# Patient Record
Sex: MALE | Race: OTHER | HISPANIC OR LATINO | Employment: FULL TIME | URBAN - METROPOLITAN AREA
[De-identification: names, ages, dates, MRNs, and addresses within clinical notes are randomized per-mention and may not be internally consistent; named-entity substitution may affect disease eponyms.]

---

## 2018-11-07 RX ORDER — CLOBETASOL PROPIONATE 0.5 MG/G
OINTMENT TOPICAL
Refills: 1 | COMMUNITY
Start: 2018-09-27 | End: 2018-11-08 | Stop reason: ALTCHOICE

## 2018-11-07 RX ORDER — TACROLIMUS 1 MG/G
OINTMENT TOPICAL
Refills: 1 | COMMUNITY
Start: 2018-09-27 | End: 2018-11-08 | Stop reason: ALTCHOICE

## 2018-11-08 ENCOUNTER — OFFICE VISIT (OUTPATIENT)
Dept: FAMILY MEDICINE CLINIC | Facility: CLINIC | Age: 43
End: 2018-11-08
Payer: COMMERCIAL

## 2018-11-08 VITALS
DIASTOLIC BLOOD PRESSURE: 76 MMHG | TEMPERATURE: 97.6 F | HEART RATE: 74 BPM | WEIGHT: 185 LBS | HEIGHT: 70 IN | OXYGEN SATURATION: 97 % | SYSTOLIC BLOOD PRESSURE: 110 MMHG | BODY MASS INDEX: 26.48 KG/M2

## 2018-11-08 DIAGNOSIS — Z13.1 SCREENING FOR DIABETES MELLITUS (DM): ICD-10-CM

## 2018-11-08 DIAGNOSIS — G89.29 CHRONIC PAIN OF LEFT KNEE: ICD-10-CM

## 2018-11-08 DIAGNOSIS — M25.562 CHRONIC PAIN OF LEFT KNEE: ICD-10-CM

## 2018-11-08 DIAGNOSIS — R10.32 LEFT INGUINAL PAIN: ICD-10-CM

## 2018-11-08 DIAGNOSIS — N52.9 ERECTILE DYSFUNCTION, UNSPECIFIED ERECTILE DYSFUNCTION TYPE: Primary | ICD-10-CM

## 2018-11-08 DIAGNOSIS — Z13.220 SCREENING FOR LIPID DISORDERS: ICD-10-CM

## 2018-11-08 PROBLEM — F12.10 MILD TETRAHYDROCANNABINOL (THC) ABUSE: Status: ACTIVE | Noted: 2018-11-08

## 2018-11-08 PROBLEM — Z72.0 TOBACCO ABUSE: Status: ACTIVE | Noted: 2018-11-08

## 2018-11-08 PROCEDURE — 99203 OFFICE O/P NEW LOW 30 MIN: CPT | Performed by: INTERNAL MEDICINE

## 2018-11-08 NOTE — PATIENT INSTRUCTIONS

## 2018-11-08 NOTE — PROGRESS NOTES
Assessment/Plan:    No problem-specific Assessment & Plan notes found for this encounter  Diagnoses and all orders for this visit:    Erectile dysfunction, unspecified erectile dysfunction type  -     CBC and differential; Future  -     Comprehensive metabolic panel; Future  -     TSH, 3rd generation with Free T4 reflex; Future  -     Testosterone, free, total; Future    Chronic pain of left knee    Left inguinal pain  -     CBC and differential; Future  -     Comprehensive metabolic panel; Future  -     Ambulatory referral to General Surgery; Future    Screening for lipid disorders  -     Lipid Panel with Direct LDL reflex; Future    Screening for diabetes mellitus (DM)  -     Hemoglobin A1C; Future    Other orders  -     Discontinue: clobetasol (TEMOVATE) 0 05 % ointment; APPLY TO RASH TWICE A DAY FOR 2 WEEKS  -     Discontinue: tacrolimus (PROTOPIC) 0 1 % ointment; APPLY DAILY FOR MAINTENANCE      A/P: Will check labs  Suspect pt has the starts of an inguinal hernia or just a recurrent MS strain/sprain  Does not appear to be diverticulitis or other GI or  event  Will refer to general sx for second opinion  Knee lesion appears to be a cyst or lipoma  Monitor and wear knee pads  May need to have is sx removed due to location  Will check labs including a testosterone to r/o organic cause for the ED  If negative, will trial viagra  Wean tobacco  Refusing a flu vaccine  RTC 2 weeks for f/u  Subjective:      Patient ID: Grace Cronin is a 39 y o  male  WM, former pt of Dr Deborah Cervantes, presents to establish  No prior PMH except for ongoing tobacco and THC use  Several c/o's  Pt c/o three month h/o intermittent left groin pain  Works as a [employer]  NO injuries  NO known triggers  No changes with food or position  No fever, chills, or sweats  No weight change  No changes in bowel or bladder habits  FHX of diverticulosis  No BRBPR, melena, hematochezia, etc  No masses noted   Pain is sharp and last several seconds and then he is fine  Next, notes several months of difficulty obtain and maintaining an erection  Hardness is an issue as well and unable to complete the act at times  NO penile lesions or d/c  NO new meds  Finally, had remote ACL sx on the left knee and over the past several years, notes a mass on the knee that can fluctuate in size and sometimes becomes tender  No redness or increase temp  No trauma, but does a lot of kneeling  Due for labs and vaccines  The following portions of the patient's history were reviewed and updated as appropriate:   He  has no past medical history on file  He   Patient Active Problem List    Diagnosis Date Noted    Tobacco abuse 11/08/2018    Erectile dysfunction 11/08/2018    Chronic pain of left knee 11/08/2018    Mild tetrahydrocannabinol [facility]) abuse 11/08/2018     He  has a past surgical history that includes Knee surgery (Left)  His family history includes Diverticulosis in his mother  He  reports that he has been smoking Cigarettes  He has a 25 00 pack-year smoking history  He has never used smokeless tobacco  He reports that he drinks about 16 8 oz of alcohol per week   He reports that he uses drugs, including Marijuana  No current outpatient prescriptions on file  No current facility-administered medications for this visit  No current outpatient prescriptions on file prior to visit  No current facility-administered medications on file prior to visit  He is allergic to aleve [naproxen]       Review of Systems   Constitutional: Negative for activity change, chills, diaphoresis, fatigue and fever  HENT: Negative  Eyes: Negative for visual disturbance  Respiratory: Negative for cough, chest tightness, shortness of breath and wheezing  Cardiovascular: Negative for chest pain, palpitations and leg swelling  Gastrointestinal: Positive for abdominal pain   Negative for abdominal distention, anal bleeding, blood in stool, constipation, diarrhea, nausea, rectal pain and vomiting  Endocrine: Negative for cold intolerance and heat intolerance  Genitourinary: Negative for difficulty urinating, discharge, dysuria, frequency, genital sores, hematuria, penile pain, penile swelling, scrotal swelling and testicular pain  ED issues   Musculoskeletal: Positive for arthralgias  Negative for gait problem and myalgias  Neurological: Negative for dizziness, seizures, syncope, weakness, light-headedness and headaches  Hematological: Does not bruise/bleed easily  Psychiatric/Behavioral: Negative for confusion, dysphoric mood and sleep disturbance  The patient is not nervous/anxious  Objective:      /76 (BP Location: Left arm, Patient Position: Sitting, Cuff Size: Standard)   Pulse 74   Temp 97 6 °F (36 4 °C) (Tympanic)   Ht 5' 10" (1 778 m)   Wt 83 9 kg (185 lb)   SpO2 97%   BMI 26 54 kg/m²          Physical Exam   Constitutional: He is oriented to person, place, and time  He appears well-developed and well-nourished  No distress  HENT:   Head: Normocephalic and atraumatic  Mouth/Throat: Oropharynx is clear and moist    Eyes: Pupils are equal, round, and reactive to light  Conjunctivae and EOM are normal    Neck: Normal range of motion  Neck supple  No JVD present  No tracheal deviation present  No thyromegaly present  Cardiovascular: Normal rate, regular rhythm and normal heart sounds  No murmur heard  Pulmonary/Chest: Effort normal and breath sounds normal  No respiratory distress  He has no wheezes  Abdominal: Soft  Bowel sounds are normal  He exhibits no distension and no mass  There is no tenderness  There is no rebound and no guarding  Left inguinal area with tenderness with deep palpation over the medial area with ??small muscle wall defect  No masses  Testes descended bilat and no masses or tenderness  Genitourinary: Penis normal    Musculoskeletal: Normal range of motion   He exhibits no edema, tenderness or deformity  Left lateral patellar area with a dime sized fluctuant and mobile, but currently non tender cyst like lesion  Lymphadenopathy:     He has no cervical adenopathy  Neurological: He is alert and oriented to person, place, and time  He has normal reflexes  No cranial nerve deficit  Coordination normal    Psychiatric: He has a normal mood and affect  His behavior is normal  Judgment and thought content normal    Nursing note and vitals reviewed

## 2018-11-15 ENCOUNTER — TRANSCRIBE ORDERS (OUTPATIENT)
Dept: LAB | Facility: CLINIC | Age: 43
End: 2018-11-15

## 2018-11-15 ENCOUNTER — OFFICE VISIT (OUTPATIENT)
Dept: SURGERY | Facility: CLINIC | Age: 43
End: 2018-11-15
Payer: COMMERCIAL

## 2018-11-15 ENCOUNTER — APPOINTMENT (OUTPATIENT)
Dept: LAB | Facility: CLINIC | Age: 43
End: 2018-11-15
Payer: COMMERCIAL

## 2018-11-15 VITALS
HEIGHT: 70 IN | RESPIRATION RATE: 14 BRPM | BODY MASS INDEX: 26.08 KG/M2 | WEIGHT: 182.2 LBS | TEMPERATURE: 98.1 F | HEART RATE: 68 BPM | SYSTOLIC BLOOD PRESSURE: 102 MMHG | DIASTOLIC BLOOD PRESSURE: 68 MMHG

## 2018-11-15 DIAGNOSIS — K40.20 NON-RECURRENT BILATERAL INGUINAL HERNIA WITHOUT OBSTRUCTION OR GANGRENE: Primary | ICD-10-CM

## 2018-11-15 DIAGNOSIS — R10.32 LEFT INGUINAL PAIN: ICD-10-CM

## 2018-11-15 DIAGNOSIS — Z13.220 SCREENING FOR LIPID DISORDERS: ICD-10-CM

## 2018-11-15 DIAGNOSIS — N52.9 ERECTILE DYSFUNCTION, UNSPECIFIED ERECTILE DYSFUNCTION TYPE: ICD-10-CM

## 2018-11-15 DIAGNOSIS — Z13.1 SCREENING FOR DIABETES MELLITUS (DM): ICD-10-CM

## 2018-11-15 PROCEDURE — 84403 ASSAY OF TOTAL TESTOSTERONE: CPT

## 2018-11-15 PROCEDURE — 84402 ASSAY OF FREE TESTOSTERONE: CPT

## 2018-11-15 PROCEDURE — 85025 COMPLETE CBC W/AUTO DIFF WBC: CPT

## 2018-11-15 PROCEDURE — 84443 ASSAY THYROID STIM HORMONE: CPT

## 2018-11-15 PROCEDURE — 99243 OFF/OP CNSLTJ NEW/EST LOW 30: CPT | Performed by: SURGERY

## 2018-11-15 PROCEDURE — 83036 HEMOGLOBIN GLYCOSYLATED A1C: CPT

## 2018-11-15 PROCEDURE — 36415 COLL VENOUS BLD VENIPUNCTURE: CPT

## 2018-11-15 PROCEDURE — 80061 LIPID PANEL: CPT

## 2018-11-15 PROCEDURE — 80053 COMPREHEN METABOLIC PANEL: CPT

## 2018-11-15 RX ORDER — ENOXAPARIN SODIUM 300 MG/3ML
40 INJECTION INTRAVENOUS; SUBCUTANEOUS
Status: CANCELLED | OUTPATIENT
Start: 2018-11-15 | End: 2018-11-16

## 2018-11-15 RX ORDER — SODIUM CHLORIDE, SODIUM LACTATE, POTASSIUM CHLORIDE, CALCIUM CHLORIDE 600; 310; 30; 20 MG/100ML; MG/100ML; MG/100ML; MG/100ML
125 INJECTION, SOLUTION INTRAVENOUS
Status: CANCELLED | OUTPATIENT
Start: 2018-11-15 | End: 2018-11-16

## 2018-11-15 NOTE — PROGRESS NOTES
Consult- General Surgery   Selina Soto 39 y o  male MRN: [id number]  Unit/Bed#:  Encounter: [id number]    Assessment/Plan     Assessment:  Bilateral inguinal hernia, symptomatic  Plan:  I advised the patient to undergo laparoscopic bilateral inguinal hernia repair with mesh in the near future  I discussed the operative procedure, risks, benefits and alternatives with the patient, he understood and agreed to proceed  The patient will not require any pre-admission testing since he already had CBC and CMP today  History of Present Illness     HPI:  Selina Soto is a 39 y o  male who presents my office for evaluation of left groin pain  The patient noted the pain on the left groin for the past 3 months, on and off, not related to any type of lifting or positioning  He denied having any bulge  He went to see his primary care physician and referred to us for further evaluation  He denied having any nausea, vomiting, diarrhea, constipation or urinary symptoms  Review of Systems   Constitutional: Negative for chills and fever  HENT: Negative for nosebleeds and sore throat  Eyes: Negative for pain and discharge  Respiratory: Negative for cough and shortness of breath  Cardiovascular: Negative for chest pain and palpitations  Gastrointestinal: Negative for abdominal pain, blood in stool, constipation, diarrhea and nausea  Endocrine: Negative for cold intolerance and heat intolerance  Genitourinary: Negative for dysuria and hematuria  Neurological: Negative for seizures and headaches  Hematological: Negative for adenopathy  Does not bruise/bleed easily  Psychiatric/Behavioral: Negative for confusion  The patient is not nervous/anxious  Historical Information   No past medical history on file    Past Surgical History:   Procedure Laterality Date    KNEE SURGERY Left     ACL repaired     Social History   History   Alcohol Use    16 8 oz/week    28 Cans of beer per week     History Drug Use    Types: Marijuana     Comment: sometimes      History   Smoking Status    Current Every Day Smoker    Packs/day: 1 00    Years: 25 00    Types: Cigarettes   Smokeless Tobacco    Never Used     Family History: non-contributory    Meds/Allergies   all medications and allergies reviewed   No current outpatient prescriptions on file  Allergies   Allergen Reactions    Aleve [Naproxen] Rash       Objective     Current Vitals:   Blood Pressure: 102/68 (11/15/18 1023)  Pulse: 68 (11/15/18 1023)  Temperature: 98 1 °F (36 7 °C) (11/15/18 1023)  Temp Source: Oral (11/15/18 1023)  Respirations: 14 (11/15/18 1023)  Height: 5' 10" (177 8 cm) (11/15/18 1023)  Weight - Scale: 82 6 kg (182 lb 3 2 oz) (11/15/18 1023)      Invasive Devices          No matching active lines, drains, or airways          Physical Exam   Constitutional: He is oriented to person, place, and time  He appears well-developed and well-nourished  No distress  HENT:   Head: Normocephalic  Mouth/Throat: No oropharyngeal exudate  Eyes: Pupils are equal, round, and reactive to light  No scleral icterus  Neck: Normal range of motion  Cardiovascular: Normal rate and regular rhythm  No murmur heard  Pulmonary/Chest: Effort normal and breath sounds normal  No respiratory distress  Abdominal: Soft  He exhibits no mass  There is no tenderness  Genitourinary:   Genitourinary Comments: Digital examination of the right and left inguinal canal with Valsalva maneuver revealed easily reducible bilateral inguinal hernia  Left side greater than the right  Musculoskeletal: He exhibits no edema or tenderness  Lymphadenopathy:     He has no cervical adenopathy  Neurological: He is alert and oriented to person, place, and time  No cranial nerve deficit  Skin: No rash noted  No erythema  Psychiatric: He has a normal mood and affect   His behavior is normal

## 2018-11-16 LAB
ALBUMIN SERPL BCP-MCNC: 4 G/DL (ref 3.5–5)
ALP SERPL-CCNC: 59 U/L (ref 46–116)
ALT SERPL W P-5'-P-CCNC: 19 U/L (ref 12–78)
ANION GAP SERPL CALCULATED.3IONS-SCNC: 5 MMOL/L (ref 4–13)
AST SERPL W P-5'-P-CCNC: 14 U/L (ref 5–45)
BASOPHILS # BLD AUTO: 0.01 THOUSANDS/ΜL (ref 0–0.1)
BASOPHILS NFR BLD AUTO: 0 % (ref 0–1)
BILIRUB SERPL-MCNC: 1.51 MG/DL (ref 0.2–1)
BUN SERPL-MCNC: 18 MG/DL (ref 5–25)
CALCIUM SERPL-MCNC: 8.3 MG/DL (ref 8.3–10.1)
CHLORIDE SERPL-SCNC: 106 MMOL/L (ref 100–108)
CHOLEST SERPL-MCNC: 188 MG/DL (ref 50–200)
CO2 SERPL-SCNC: 26 MMOL/L (ref 21–32)
CREAT SERPL-MCNC: 1.18 MG/DL (ref 0.6–1.3)
EOSINOPHIL # BLD AUTO: 0.05 THOUSAND/ΜL (ref 0–0.61)
EOSINOPHIL NFR BLD AUTO: 1 % (ref 0–6)
ERYTHROCYTE [DISTWIDTH] IN BLOOD BY AUTOMATED COUNT: 13.7 % (ref 11.6–15.1)
EST. AVERAGE GLUCOSE BLD GHB EST-MCNC: 108 MG/DL
GFR SERPL CREATININE-BSD FRML MDRD: 75 ML/MIN/1.73SQ M
GLUCOSE P FAST SERPL-MCNC: 91 MG/DL (ref 65–99)
HBA1C MFR BLD: 5.4 % (ref 4.2–6.3)
HCT VFR BLD AUTO: 48 % (ref 36.5–49.3)
HDLC SERPL-MCNC: 60 MG/DL (ref 40–60)
HGB BLD-MCNC: 15.5 G/DL (ref 12–17)
IMM GRANULOCYTES # BLD AUTO: 0.02 THOUSAND/UL (ref 0–0.2)
IMM GRANULOCYTES NFR BLD AUTO: 0 % (ref 0–2)
LDLC SERPL CALC-MCNC: 111 MG/DL (ref 0–100)
LYMPHOCYTES # BLD AUTO: 2.22 THOUSANDS/ΜL (ref 0.6–4.47)
LYMPHOCYTES NFR BLD AUTO: 30 % (ref 14–44)
MCH RBC QN AUTO: 30.2 PG (ref 26.8–34.3)
MCHC RBC AUTO-ENTMCNC: 32.3 G/DL (ref 31.4–37.4)
MCV RBC AUTO: 94 FL (ref 82–98)
MONOCYTES # BLD AUTO: 0.42 THOUSAND/ΜL (ref 0.17–1.22)
MONOCYTES NFR BLD AUTO: 6 % (ref 4–12)
NEUTROPHILS # BLD AUTO: 4.61 THOUSANDS/ΜL (ref 1.85–7.62)
NEUTS SEG NFR BLD AUTO: 63 % (ref 43–75)
NRBC BLD AUTO-RTO: 0 /100 WBCS
PLATELET # BLD AUTO: 322 THOUSANDS/UL (ref 149–390)
PMV BLD AUTO: 10.4 FL (ref 8.9–12.7)
POTASSIUM SERPL-SCNC: 4.4 MMOL/L (ref 3.5–5.3)
PROT SERPL-MCNC: 7.7 G/DL (ref 6.4–8.2)
RBC # BLD AUTO: 5.13 MILLION/UL (ref 3.88–5.62)
SODIUM SERPL-SCNC: 137 MMOL/L (ref 136–145)
TRIGL SERPL-MCNC: 84 MG/DL
TSH SERPL DL<=0.05 MIU/L-ACNC: 0.98 UIU/ML (ref 0.36–3.74)
WBC # BLD AUTO: 7.33 THOUSAND/UL (ref 4.31–10.16)

## 2018-11-18 LAB
TESTOST FREE SERPL-MCNC: 15.7 PG/ML (ref 6.8–21.5)
TESTOST SERPL-MCNC: 575 NG/DL (ref 264–916)

## 2018-12-04 ENCOUNTER — ANESTHESIA EVENT (OUTPATIENT)
Dept: PERIOP | Facility: HOSPITAL | Age: 43
End: 2018-12-04
Payer: COMMERCIAL

## 2018-12-04 NOTE — ANESTHESIA PREPROCEDURE EVALUATION
Review of Systems/Medical History    Chart reviewed  No history of anesthetic complications     Cardiovascular  EKG reviewed, Negative cardio ROS    Pulmonary  Smoker cigarette smoker  , Tobacco cessation counseling given Cumulative Pack Years: 25,   Comment: Marijuana use     GI/Hepatic  Negative GI/hepatic ROS     Comment: Non recurrent B/L inguinal hernia w/o obstruction     Negative  ROS        Endo/Other  Negative endo/other ROS      GYN       Hematology  Negative hematology ROS      Musculoskeletal    Comment: Chronic left knee pain      Neurology  Negative neurology ROS      Psychology   Negative psychology ROS              Physical Exam    Airway    Mallampati score: II         Dental       Cardiovascular  Comment: Negative ROS, Cardiovascular exam normal    Pulmonary  Pulmonary exam normal     Other Findings        Anesthesia Plan  ASA Score- 2     Anesthesia Type- general with ASA Monitors  Additional Monitors:   Airway Plan: ETT  Plan Factors- Patient instructed to abstain from smoking on day of procedure  Patient smoked on day of surgery  Induction- intravenous  Postoperative Plan- Plan for postoperative opioid use  Planned trial extubation    Informed Consent- Anesthetic plan and risks discussed with patient

## 2018-12-05 ENCOUNTER — HOSPITAL ENCOUNTER (OUTPATIENT)
Facility: HOSPITAL | Age: 43
Setting detail: OUTPATIENT SURGERY
Discharge: HOME | End: 2018-12-05
Attending: SURGERY | Admitting: SURGERY
Payer: COMMERCIAL

## 2018-12-05 ENCOUNTER — ANESTHESIA (OUTPATIENT)
Dept: PERIOP | Facility: HOSPITAL | Age: 43
End: 2018-12-05
Payer: COMMERCIAL

## 2018-12-05 VITALS
HEIGHT: 71 IN | WEIGHT: 181.25 LBS | RESPIRATION RATE: 20 BRPM | DIASTOLIC BLOOD PRESSURE: 66 MMHG | OXYGEN SATURATION: 97 % | BODY MASS INDEX: 25.37 KG/M2 | HEART RATE: 64 BPM | TEMPERATURE: 96.9 F | SYSTOLIC BLOOD PRESSURE: 102 MMHG

## 2018-12-05 DIAGNOSIS — K40.20 NON-RECURRENT BILATERAL INGUINAL HERNIA WITHOUT OBSTRUCTION OR GANGRENE: ICD-10-CM

## 2018-12-05 PROCEDURE — 49650 LAP ING HERNIA REPAIR INIT: CPT | Performed by: SURGERY

## 2018-12-05 PROCEDURE — C1781 MESH (IMPLANTABLE): HCPCS | Performed by: SURGERY

## 2018-12-05 PROCEDURE — 49650 LAP ING HERNIA REPAIR INIT: CPT | Performed by: PHYSICIAN ASSISTANT

## 2018-12-05 DEVICE — MESH LAP ABSORBABLE 10 X 15CM PROGRIP: Type: IMPLANTABLE DEVICE | Site: INGUINAL | Status: FUNCTIONAL

## 2018-12-05 RX ORDER — ONDANSETRON 2 MG/ML
4 INJECTION INTRAMUSCULAR; INTRAVENOUS ONCE
Status: DISCONTINUED | OUTPATIENT
Start: 2018-12-05 | End: 2018-12-05 | Stop reason: HOSPADM

## 2018-12-05 RX ORDER — LIDOCAINE HYDROCHLORIDE 10 MG/ML
INJECTION, SOLUTION INFILTRATION; PERINEURAL AS NEEDED
Status: DISCONTINUED | OUTPATIENT
Start: 2018-12-05 | End: 2018-12-05 | Stop reason: SURG

## 2018-12-05 RX ORDER — ROCURONIUM BROMIDE 10 MG/ML
INJECTION, SOLUTION INTRAVENOUS AS NEEDED
Status: DISCONTINUED | OUTPATIENT
Start: 2018-12-05 | End: 2018-12-05 | Stop reason: SURG

## 2018-12-05 RX ORDER — DEXMEDETOMIDINE HYDROCHLORIDE 100 UG/ML
INJECTION, SOLUTION INTRAVENOUS AS NEEDED
Status: DISCONTINUED | OUTPATIENT
Start: 2018-12-05 | End: 2018-12-05 | Stop reason: SURG

## 2018-12-05 RX ORDER — ALBUTEROL SULFATE 2.5 MG/3ML
2.5 SOLUTION RESPIRATORY (INHALATION) ONCE
Status: DISCONTINUED | OUTPATIENT
Start: 2018-12-05 | End: 2018-12-05 | Stop reason: HOSPADM

## 2018-12-05 RX ORDER — SODIUM CHLORIDE, SODIUM LACTATE, POTASSIUM CHLORIDE, CALCIUM CHLORIDE 600; 310; 30; 20 MG/100ML; MG/100ML; MG/100ML; MG/100ML
125 INJECTION, SOLUTION INTRAVENOUS
Status: COMPLETED | OUTPATIENT
Start: 2018-12-05 | End: 2018-12-05

## 2018-12-05 RX ORDER — FENTANYL CITRATE 50 UG/ML
INJECTION, SOLUTION INTRAMUSCULAR; INTRAVENOUS AS NEEDED
Status: DISCONTINUED | OUTPATIENT
Start: 2018-12-05 | End: 2018-12-05 | Stop reason: SURG

## 2018-12-05 RX ORDER — BUPIVACAINE HYDROCHLORIDE 2.5 MG/ML
INJECTION, SOLUTION EPIDURAL; INFILTRATION; INTRACAUDAL AS NEEDED
Status: DISCONTINUED | OUTPATIENT
Start: 2018-12-05 | End: 2018-12-05 | Stop reason: HOSPADM

## 2018-12-05 RX ORDER — ONDANSETRON 2 MG/ML
4 INJECTION INTRAMUSCULAR; INTRAVENOUS EVERY 6 HOURS PRN
Status: DISCONTINUED | OUTPATIENT
Start: 2018-12-05 | End: 2018-12-05 | Stop reason: HOSPADM

## 2018-12-05 RX ORDER — GLYCOPYRROLATE 0.2 MG/ML
INJECTION INTRAMUSCULAR; INTRAVENOUS AS NEEDED
Status: DISCONTINUED | OUTPATIENT
Start: 2018-12-05 | End: 2018-12-05 | Stop reason: SURG

## 2018-12-05 RX ORDER — CEFAZOLIN SODIUM 2 G/50ML
2000 SOLUTION INTRAVENOUS
Status: COMPLETED | OUTPATIENT
Start: 2018-12-05 | End: 2018-12-05

## 2018-12-05 RX ORDER — KETOROLAC TROMETHAMINE 30 MG/ML
INJECTION, SOLUTION INTRAMUSCULAR; INTRAVENOUS AS NEEDED
Status: DISCONTINUED | OUTPATIENT
Start: 2018-12-05 | End: 2018-12-05 | Stop reason: SURG

## 2018-12-05 RX ORDER — FENTANYL CITRATE/PF 50 MCG/ML
50 SYRINGE (ML) INJECTION
Status: DISCONTINUED | OUTPATIENT
Start: 2018-12-05 | End: 2018-12-05 | Stop reason: HOSPADM

## 2018-12-05 RX ORDER — OXYCODONE HYDROCHLORIDE AND ACETAMINOPHEN 5; 325 MG/1; MG/1
1 TABLET ORAL EVERY 4 HOURS PRN
Status: DISCONTINUED | OUTPATIENT
Start: 2018-12-05 | End: 2018-12-05 | Stop reason: HOSPADM

## 2018-12-05 RX ORDER — MAGNESIUM HYDROXIDE 1200 MG/15ML
LIQUID ORAL AS NEEDED
Status: DISCONTINUED | OUTPATIENT
Start: 2018-12-05 | End: 2018-12-05 | Stop reason: HOSPADM

## 2018-12-05 RX ORDER — ACETAMINOPHEN AND CODEINE PHOSPHATE 300; 30 MG/1; MG/1
1 TABLET ORAL EVERY 4 HOURS PRN
Qty: 30 TABLET | Refills: 0 | Status: SHIPPED | OUTPATIENT
Start: 2018-12-05 | End: 2018-12-15

## 2018-12-05 RX ORDER — PROPOFOL 10 MG/ML
INJECTION, EMULSION INTRAVENOUS AS NEEDED
Status: DISCONTINUED | OUTPATIENT
Start: 2018-12-05 | End: 2018-12-05 | Stop reason: SURG

## 2018-12-05 RX ORDER — SODIUM CHLORIDE, SODIUM LACTATE, POTASSIUM CHLORIDE, CALCIUM CHLORIDE 600; 310; 30; 20 MG/100ML; MG/100ML; MG/100ML; MG/100ML
125 INJECTION, SOLUTION INTRAVENOUS CONTINUOUS
Status: DISCONTINUED | OUTPATIENT
Start: 2018-12-05 | End: 2018-12-05 | Stop reason: HOSPADM

## 2018-12-05 RX ORDER — MIDAZOLAM HYDROCHLORIDE 1 MG/ML
INJECTION INTRAMUSCULAR; INTRAVENOUS AS NEEDED
Status: DISCONTINUED | OUTPATIENT
Start: 2018-12-05 | End: 2018-12-05 | Stop reason: SURG

## 2018-12-05 RX ORDER — ONDANSETRON 2 MG/ML
INJECTION INTRAMUSCULAR; INTRAVENOUS AS NEEDED
Status: DISCONTINUED | OUTPATIENT
Start: 2018-12-05 | End: 2018-12-05 | Stop reason: SURG

## 2018-12-05 RX ORDER — HYDROMORPHONE HCL/PF 1 MG/ML
0.5 SYRINGE (ML) INJECTION
Status: DISCONTINUED | OUTPATIENT
Start: 2018-12-05 | End: 2018-12-05 | Stop reason: HOSPADM

## 2018-12-05 RX ORDER — NEOSTIGMINE METHYLSULFATE 1 MG/ML
INJECTION INTRAVENOUS AS NEEDED
Status: DISCONTINUED | OUTPATIENT
Start: 2018-12-05 | End: 2018-12-05 | Stop reason: SURG

## 2018-12-05 RX ADMIN — PROPOFOL 200 MG: 10 INJECTION, EMULSION INTRAVENOUS at 11:03

## 2018-12-05 RX ADMIN — SODIUM CHLORIDE, SODIUM LACTATE, POTASSIUM CHLORIDE, AND CALCIUM CHLORIDE 125 ML/HR: .6; .31; .03; .02 INJECTION, SOLUTION INTRAVENOUS at 09:45

## 2018-12-05 RX ADMIN — Medication 4 MG: at 11:03

## 2018-12-05 RX ADMIN — DEXMEDETOMIDINE HYDROCHLORIDE 12 MCG: 100 INJECTION, SOLUTION INTRAVENOUS at 11:03

## 2018-12-05 RX ADMIN — SODIUM CHLORIDE, SODIUM LACTATE, POTASSIUM CHLORIDE, AND CALCIUM CHLORIDE: .6; .31; .03; .02 INJECTION, SOLUTION INTRAVENOUS at 10:52

## 2018-12-05 RX ADMIN — ROCURONIUM BROMIDE 40 MG: 10 INJECTION, SOLUTION INTRAVENOUS at 11:03

## 2018-12-05 RX ADMIN — NEOSTIGMINE METHYLSULFATE 3 MG: 1 INJECTION INTRAVENOUS at 11:53

## 2018-12-05 RX ADMIN — ENOXAPARIN SODIUM 40 MG: 40 INJECTION SUBCUTANEOUS at 09:45

## 2018-12-05 RX ADMIN — ONDANSETRON 4 MG: 2 INJECTION INTRAMUSCULAR; INTRAVENOUS at 11:03

## 2018-12-05 RX ADMIN — FENTANYL CITRATE 50 MCG: 50 INJECTION INTRAMUSCULAR; INTRAVENOUS at 12:21

## 2018-12-05 RX ADMIN — DEXMEDETOMIDINE HYDROCHLORIDE 12 MCG: 100 INJECTION, SOLUTION INTRAVENOUS at 11:35

## 2018-12-05 RX ADMIN — FENTANYL CITRATE 50 MCG: 50 INJECTION INTRAMUSCULAR; INTRAVENOUS at 12:29

## 2018-12-05 RX ADMIN — GLYCOPYRROLATE 0.4 MG: 0.2 INJECTION INTRAMUSCULAR; INTRAVENOUS at 11:53

## 2018-12-05 RX ADMIN — OXYCODONE HYDROCHLORIDE AND ACETAMINOPHEN 1 TABLET: 5; 325 TABLET ORAL at 13:26

## 2018-12-05 RX ADMIN — CEFAZOLIN SODIUM 2000 MG: 2 SOLUTION INTRAVENOUS at 10:58

## 2018-12-05 RX ADMIN — SODIUM CHLORIDE, SODIUM LACTATE, POTASSIUM CHLORIDE, AND CALCIUM CHLORIDE: .6; .31; .03; .02 INJECTION, SOLUTION INTRAVENOUS at 11:45

## 2018-12-05 RX ADMIN — MIDAZOLAM HYDROCHLORIDE 2 MG: 1 INJECTION INTRAMUSCULAR; INTRAVENOUS at 10:58

## 2018-12-05 RX ADMIN — LIDOCAINE HYDROCHLORIDE 50 MG: 10 INJECTION, SOLUTION INFILTRATION; PERINEURAL at 11:03

## 2018-12-05 RX ADMIN — FENTANYL CITRATE 100 MCG: 50 INJECTION, SOLUTION INTRAMUSCULAR; INTRAVENOUS at 11:03

## 2018-12-05 RX ADMIN — KETOROLAC TROMETHAMINE 30 MG: 30 INJECTION, SOLUTION INTRAMUSCULAR; INTRAVENOUS at 11:03

## 2018-12-05 NOTE — H&P (VIEW-ONLY)
Consult- General Surgery   Sulma Lozada 39 y o  male MRN: [id number]  Unit/Bed#:  Encounter: [id number]    Assessment/Plan     Assessment:  Bilateral inguinal hernia, symptomatic  Plan:  I advised the patient to undergo laparoscopic bilateral inguinal hernia repair with mesh in the near future  I discussed the operative procedure, risks, benefits and alternatives with the patient, he understood and agreed to proceed  The patient will not require any pre-admission testing since he already had CBC and CMP today  History of Present Illness     HPI:  Sulma Lozada is a 39 y o  male who presents my office for evaluation of left groin pain  The patient noted the pain on the left groin for the past 3 months, on and off, not related to any type of lifting or positioning  He denied having any bulge  He went to see his primary care physician and referred to us for further evaluation  He denied having any nausea, vomiting, diarrhea, constipation or urinary symptoms  Review of Systems   Constitutional: Negative for chills and fever  HENT: Negative for nosebleeds and sore throat  Eyes: Negative for pain and discharge  Respiratory: Negative for cough and shortness of breath  Cardiovascular: Negative for chest pain and palpitations  Gastrointestinal: Negative for abdominal pain, blood in stool, constipation, diarrhea and nausea  Endocrine: Negative for cold intolerance and heat intolerance  Genitourinary: Negative for dysuria and hematuria  Neurological: Negative for seizures and headaches  Hematological: Negative for adenopathy  Does not bruise/bleed easily  Psychiatric/Behavioral: Negative for confusion  The patient is not nervous/anxious  Historical Information   No past medical history on file    Past Surgical History:   Procedure Laterality Date    KNEE SURGERY Left     ACL repaired     Social History   History   Alcohol Use    16 8 oz/week    28 Cans of beer per week     History Drug Use    Types: Marijuana     Comment: sometimes      History   Smoking Status    Current Every Day Smoker    Packs/day: 1 00    Years: 25 00    Types: Cigarettes   Smokeless Tobacco    Never Used     Family History: non-contributory    Meds/Allergies   all medications and allergies reviewed   No current outpatient prescriptions on file  Allergies   Allergen Reactions    Aleve [Naproxen] Rash       Objective     Current Vitals:   Blood Pressure: 102/68 (11/15/18 1023)  Pulse: 68 (11/15/18 1023)  Temperature: 98 1 °F (36 7 °C) (11/15/18 1023)  Temp Source: Oral (11/15/18 1023)  Respirations: 14 (11/15/18 1023)  Height: 5' 10" (177 8 cm) (11/15/18 1023)  Weight - Scale: 82 6 kg (182 lb 3 2 oz) (11/15/18 1023)      Invasive Devices          No matching active lines, drains, or airways          Physical Exam   Constitutional: He is oriented to person, place, and time  He appears well-developed and well-nourished  No distress  HENT:   Head: Normocephalic  Mouth/Throat: No oropharyngeal exudate  Eyes: Pupils are equal, round, and reactive to light  No scleral icterus  Neck: Normal range of motion  Cardiovascular: Normal rate and regular rhythm  No murmur heard  Pulmonary/Chest: Effort normal and breath sounds normal  No respiratory distress  Abdominal: Soft  He exhibits no mass  There is no tenderness  Genitourinary:   Genitourinary Comments: Digital examination of the right and left inguinal canal with Valsalva maneuver revealed easily reducible bilateral inguinal hernia  Left side greater than the right  Musculoskeletal: He exhibits no edema or tenderness  Lymphadenopathy:     He has no cervical adenopathy  Neurological: He is alert and oriented to person, place, and time  No cranial nerve deficit  Skin: No rash noted  No erythema  Psychiatric: He has a normal mood and affect   His behavior is normal

## 2018-12-05 NOTE — NURSING NOTE
Patient ambulated to bathroom  Tolerated well  Pain not any worse  Patient states he is ready to go home

## 2018-12-05 NOTE — DISCHARGE INSTRUCTIONS
No diet restriction for this surgery  May shower every day  Remove dressings in 3 days  Remove strips in 7 days  Call office to make an appointment in 2 weeks [phone]  Call office with any issues regarding the surgery  No driving, heavy lifting or strenuous exercise for one week  Resume home medications  Apply ice to the incisions  May take Tylenol 3, regular Tylenol or ibuprofen for pain  Alternating narcotics with ibuprofen or Advil leave will have better pain control  May take Colace for constipation  If you experience urinary retention, please contact your Urologist or go to the emergency room to be treated        Laparoscopic Herniorrhaphy   WHAT YOU NEED TO KNOW:   Laparoscopic herniorrhaphy is surgery to repair a hernia  DISCHARGE INSTRUCTIONS:   Medicines:   · Prescription pain medicine  may be given  Ask your healthcare provider how to take this medicine safely  · Take your medicine as directed  Contact your healthcare provider if you think your medicine is not helping or if you have side effects  Tell him if you are allergic to any medicine  Keep a list of the medicines, vitamins, and herbs you take  Include the amounts, and when and why you take them  Bring the list or the pill bottles to follow-up visits  Carry your medicine list with you in case of an emergency  Follow up with your healthcare provider or surgeon as directed:  Write down your questions so you remember to ask them during your visits  Activity:  You may need to avoid heavy lifting until your surgery site heals  Ask your healthcare provider when you may return to your normal activities  Do not exercise until your healthcare provider says it is okay  Ask your healthcare provider to help you plan your exercise program    Bowel movements:  Exercise, such as walking, can help you have regular bowel movements  Eat high-fiber foods and drink more liquids  Some foods high in fiber include fruit, vegetables, and whole grains  Healthcare providers may give you a stool softener to help make your bowel movements softer and more regular  Wound care: You may have a bandage over your surgery site when you leave the hospital  Do not take the bandage off until your healthcare provider says it is okay  Ask your healthcare provider how to care for your wound at home  Contact your healthcare provider or surgeon if:   · You are bleeding more than expected from your surgery site  · You have a fever  · Your surgery site is swollen, red, or has pus coming from it  · You have questions or concerns about your condition or care  Seek care immediately or call 911 if:   · You feel lightheaded, short of breath, and have chest pain  · You cough up blood  · Your arm or leg feels warm, tender, and painful  It may look swollen and red  · You have blood clots or fluid around your surgery site  · You have pain in your groin or surgery site that does not get better after you take pain medicine  · You have trouble urinating  · You notice a new lump at your surgery site  · You suddenly have numbness in your groin area  © 2017 [facility] Information is for End User's use only and may not be sold, redistributed or otherwise used for commercial purposes  All illustrations and images included in CareNotes® are the copyrighted property of A D A M [employer] Inc  or [employer] Lem[employer]  The above information is an  only  It is not intended as medical advice for individual conditions or treatments  Talk to your doctor, nurse or pharmacist before following any medical regimen to see if it is safe and effective for you

## 2018-12-05 NOTE — OP NOTE
OPERATIVE REPORT  PATIENT NAME: Selvin Teran    :  1975  MRN: [id number]  Pt Location: [facility] OR ROOM 04    SURGERY DATE: 2018    Surgeon(s) and Role:     * Mj Gonsalez MD - Primary     * Jessica Jerome PA-C - Assisting    Preop Diagnosis:  Non-recurrent bilateral inguinal hernia without obstruction or gangrene [K40 20]    Post-Op Diagnosis Codes:     * Non-recurrent bilateral inguinal hernia without obstruction or gangrene [K40 20]    Procedure(s) (LRB):  LAPAROSCOPIC INGUINAL HERNIA REPAIR WITH MESH (Bilateral)    Specimen(s):  * No specimens in log *    Estimated Blood Loss:   Minimal    Drains:       Anesthesia Type:   General    Operative Indications:  Non-recurrent bilateral inguinal hernia without obstruction or gangrene [K40 20]      Operative Findings: There is a moderate size right and left indirect inguinal hernia  Complications:   None    Procedure and Technique:  The patient was identified he was placed in the operating table in a supine position  After adequate anesthesia induction and satisfactory endotracheal intubation the abdomen and perineum were prepped and draped in sterile usual fashion with chlor prep  Timeout was called the patient was identified as well as surgical sites  An infra umbilical incision was made with a scalpel, taken down through the subcutaneous tissue with electrocautery  The fascia of the rectus muscle was opened with electrocautery in a transverse fashion  The rectus muscle was retracted laterally and with blunt finger dissection a space was created posterior to the rectus muscle  The balloon dissector was inserted into the preperitoneal space and insufflated under direct vision  The balloon was deflated and subsequently retrieved  The structural balloon was placed into the preperitoneal space and insufflated  The preperitoneal space was insufflated with CO2   The scope was advanced and then we proceeded to place a 5 mm trocar in the suprapubic area ×2 under direct vision  At this point the symphysis pubis was identified and Jim's ligament on the right side  The cord structures were identified by gentle pulling on the right testicle  I proceeded to dissect between the inferior epigastric vessels and the cord structures laterally until the lateral wall was identified  There proceeded to dissect medially towards the cord structure  The large hernia sac was identified and dissected off the cord structure as high as the superior Iliac spine  12 x 15 cm Pro  mesh was placed and tacked it into anterior abdominal wall  At this point the attention was directed into the left side  The symphysis pubis and Jim's ligament were identified on the left side  The cord structure were identified by gentle pulling on the left testicle  I proceeded to dissect the to the inferior epigastric vessels and the cord structures until the lateral wall was identified  Then a proceeded to dissect medially towards the cord structure and the hernia sac was dissected off the cord structure as high as the superior iliac spine  12 x 15 cm Pro  mesh was placed and tacked it into the anterior abdominal wall  The CO2 was deflated and the ports were removed under direct vision with noticeable bleeding from the abdominal wall  The structural balloon was deflated and subsequently retrieved  The infra umbilical port site fascia was closed with a 0 Vicryl in an interrupted figure-of-eight fashion  The subcutaneous tissue was infiltrated with 0 5% Marcaine and the skin was closed with a 4-0 Vicryl in an interrupted subcuticular fashion  Sterile dressing were applied  At the end of the case instrument, needles, sponges counts were correct  The patient tolerated the procedure well and then he was transferred to recovery room in a stable conditions     I was present for the entire procedure, A qualified resident physician was not available and A physician assistant was required during the procedure for retraction tissue handling,dissection and suturing    Patient Disposition:  PACU , hemodynamically stable and extubated and stable    SIGNATURE: Kong Emery MD  DATE: December 5, 2018  TIME: 11:59 AM

## 2018-12-20 ENCOUNTER — OFFICE VISIT (OUTPATIENT)
Dept: SURGERY | Facility: CLINIC | Age: 43
End: 2018-12-20

## 2018-12-20 VITALS
BODY MASS INDEX: 25.48 KG/M2 | TEMPERATURE: 98.7 F | WEIGHT: 182 LBS | HEART RATE: 79 BPM | HEIGHT: 71 IN | SYSTOLIC BLOOD PRESSURE: 112 MMHG | DIASTOLIC BLOOD PRESSURE: 72 MMHG | RESPIRATION RATE: 14 BRPM

## 2018-12-20 DIAGNOSIS — Z48.89 POSTOPERATIVE VISIT: Primary | ICD-10-CM

## 2018-12-20 PROCEDURE — 99024 POSTOP FOLLOW-UP VISIT: CPT | Performed by: SURGERY

## 2018-12-20 NOTE — PROGRESS NOTES
Post-Op Follow Up- General Surgery   Shi Reyes 39 y o  male MRN: [id number]  Unit/Bed#:  Encounter: [id number]    Assessment/Plan     Assessment:  Status post laparoscopic bilateral inguinal hernia repair with mesh, improving  Plan:  The patient still have some discomfort in the lower abdomen, he will return to my office in 1 month for follow-up  Tentatively go back to work full duty in 2 weeks  History of Present Illness     HPI:  Shi Reyes is a 39 y o  male who presents to my office for follow-up after laparoscopic bilateral inguinal hernia repair with mesh  Still complaining of some discomfort in the lower abdomen  Denies having any fever, chills, nausea, vomiting, diarrhea, constipation or any other constitutional symptoms  Historical Information   No past medical history on file  Past Surgical History:   Procedure Laterality Date    KNEE SURGERY Left     ACL repaired    CT LAP,INGUINAL HERNIA REPR,INITIAL Bilateral 12/5/2018    Procedure: LAPAROSCOPIC INGUINAL HERNIA REPAIR WITH MESH;  Surgeon: Kong Emery MD;  Location: [facility] OR;  Service: General     Social History   History   Alcohol Use    16 8 oz/week    28 Cans of beer per week     History   Drug Use    Frequency: 7 0 times per week    Types: Marijuana     Comment: sometimes      History   Smoking Status    Current Every Day Smoker    Packs/day: 1 00    Years: 25 00    Types: Cigarettes   Smokeless Tobacco    Never Used     Comment: pt  smoked ciggarette this am 12/5     Family History: non-contributory    Meds/Allergies   all medications and allergies reviewed   No current outpatient prescriptions on file    Allergies   Allergen Reactions    Aleve [Naproxen] Rash       Objective     Current Vitals:   Blood Pressure: 112/72 (12/20/18 0848)  Pulse: 79 (12/20/18 0848)  Temperature: 98 7 °F (37 1 °C) (12/20/18 0848)  Temp Source: Tympanic (12/20/18 0848)  Respirations: 14 (12/20/18 0848)  Height: 5' 11" (180 3 cm) (12/20/18 0848)  Weight - Scale: 82 6 kg (182 lb) (12/20/18 0848)      Invasive Devices          No matching active lines, drains, or airways          Physical Exam:  The abdomen is soft, nondistended and mildly tender lower abdomen without guarding or rebound  There is no ecchymosis or recurrence of the hernia  Incisions are well-healed

## 2019-01-24 ENCOUNTER — OFFICE VISIT (OUTPATIENT)
Dept: SURGERY | Facility: CLINIC | Age: 44
End: 2019-01-24

## 2019-01-24 VITALS
RESPIRATION RATE: 13 BRPM | HEART RATE: 81 BPM | DIASTOLIC BLOOD PRESSURE: 62 MMHG | SYSTOLIC BLOOD PRESSURE: 106 MMHG | TEMPERATURE: 98 F | BODY MASS INDEX: 26.74 KG/M2 | HEIGHT: 71 IN | WEIGHT: 191.02 LBS

## 2019-01-24 DIAGNOSIS — Z48.89 POSTOPERATIVE VISIT: Primary | ICD-10-CM

## 2019-01-24 PROCEDURE — 99024 POSTOP FOLLOW-UP VISIT: CPT | Performed by: SURGERY

## 2019-01-24 NOTE — PROGRESS NOTES
Post-Op Follow Up- General Surgery   Sulma Lozada 39 y o  male MRN: [id number]  Unit/Bed#:  Encounter: [id number]    Assessment/Plan     Assessment:  Status post laparoscopic bilateral inguinal hernia repair with mesh, improved  Plan:  The patient is discharged from my care and I will be glad to see him if any problem arises in the future  History of Present Illness     HPI:  Sulma Lozada is a 39 y o  male who presents to my office for 2nd postop follow-up after laparoscopic bilateral inguinal hernia repair with mesh, he offers no complaints at this time  Historical Information   No past medical history on file  Past Surgical History:   Procedure Laterality Date    KNEE SURGERY Left     ACL repaired    [place] LAP,INGUINAL HERNIA REPR,INITIAL Bilateral 12/5/2018    Procedure: LAPAROSCOPIC INGUINAL HERNIA REPAIR WITH MESH;  Surgeon: Nuno Lui MD;  Location: [facility] OR;  Service: General     Social History   History   Alcohol Use    16 8 oz/week    28 Cans of beer per week     Comment: daily      History   Drug Use    Frequency: 7 0 times per week    Types: Marijuana     Comment: sometimes      History   Smoking Status    Current Every Day Smoker    Packs/day: 1 00    Years: 25 00    Types: Cigarettes   Smokeless Tobacco    Never Used     Comment: pt  smoked ciggarette this am 12/5     Family History: non-contributory    Meds/Allergies   all medications and allergies reviewed   No current outpatient prescriptions on file    Allergies   Allergen Reactions    Aleve [Naproxen] Rash       Objective     Current Vitals:   Blood Pressure: 106/62 (01/24/19 1318)  Pulse: 81 (01/24/19 1318)  Temperature: 98 °F (36 7 °C) (01/24/19 1318)  Temp Source: Tympanic (01/24/19 1318)  Respirations: 13 (01/24/19 1318)  Height: 5' 11" (180 3 cm) (01/24/19 1318)  Weight - Scale: 86 6 kg (191 lb 0 3 oz) (01/24/19 1318)      Invasive Devices          No matching active lines, drains, or airways          Physical Exam:  The abdomen is soft, nondistended and nontender  Incisions are well-healed  There is no evidence of recurrence of the hernias

## 2019-10-04 NOTE — ANESTHESIA POSTPROCEDURE EVALUATION
Post-Op Assessment Note      CV Status:  Stable    Mental Status:  Alert and awake    Hydration Status:  Euvolemic    PONV Controlled:  Controlled    Airway Patency:  Patent    Post Op Vitals Reviewed: Yes          Staff: Anesthesiologist, CRNA           /74 (12/05/18 1212)    Temp 98 3 °F (36 8 °C) (12/05/18 1212)    Pulse 63 (12/05/18 1212)   Resp 16 (12/05/18 1212)    SpO2 97 % (12/05/18 1212)
COUGH

## 2022-05-13 NOTE — PRE-PROCEDURE INSTRUCTIONS
No outpatient prescriptions have been marked as taking for the 12/5/18 encounter [facility] Encounter) 
No

## (undated) DEVICE — SUT VICRYL 4-0 PS-2 27 IN J426H

## (undated) DEVICE — GAUZE SPONGES,8 PLY: Brand: CURITY

## (undated) DEVICE — 3M™ STERI-STRIP™ REINFORCED ADHESIVE SKIN CLOSURES, R1542, 1/4 IN X 1-1/2 IN (6 MM X 38 MM), 6 STRIPS/ENVELOPE: Brand: 3M™ STERI-STRIP™

## (undated) DEVICE — ELECTRODE LAP L WIRE E-Z CLEAN 33CM -0100

## (undated) DEVICE — INTENDED FOR TISSUE SEPARATION, AND OTHER PROCEDURES THAT REQUIRE A SHARP SURGICAL BLADE TO PUNCTURE OR CUT.: Brand: BARD-PARKER SAFETY BLADES SIZE 15, STERILE

## (undated) DEVICE — REM POLYHESIVE ADULT PATIENT RETURN ELECTRODE: Brand: VALLEYLAB

## (undated) DEVICE — SCD SEQUENTIAL COMPRESSION COMFORT SLEEVE MEDIUM KNEE LENGTH: Brand: KENDALL SCD

## (undated) DEVICE — 3M™ TEGADERM™ TRANSPARENT FILM DRESSING FRAME STYLE, 1624W, 2-3/8 IN X 2-3/4 IN (6 CM X 7 CM), 100/CT 4CT/CASE: Brand: 3M™ TEGADERM™

## (undated) DEVICE — ENDOPATH XCEL UNIVERSAL TROCAR STABLILITY SLEEVES: Brand: ENDOPATH XCEL

## (undated) DEVICE — TROCAR HERNIA OVAL PERITONEAL DISTENTION BALLOON

## (undated) DEVICE — [HIGH FLOW INSUFFLATOR,  DO NOT USE IF PACKAGE IS DAMAGED,  KEEP DRY,  KEEP AWAY FROM SUNLIGHT,  PROTECT FROM HEAT AND RADIOACTIVE SOURCES.]: Brand: PNEUMOSURE

## (undated) DEVICE — DRAPE EQUIPMENT RF WAND

## (undated) DEVICE — SPONGE GAUZE 2 X 2 4PLY STRL

## (undated) DEVICE — ALLENTOWN LAP CHOLE APP PACK: Brand: CARDINAL HEALTH

## (undated) DEVICE — CHLORAPREP HI-LITE 26ML ORANGE

## (undated) DEVICE — 3M™ STERI-STRIP™ REINFORCED ADHESIVE SKIN CLOSURES, R1546, 1/4 IN X 4 IN (6 MM X 100 MM), 10 STRIPS/ENVELOPE: Brand: 3M™ STERI-STRIP™

## (undated) DEVICE — GLOVE INDICATOR PI UNDERGLOVE SZ 7.5 BLUE

## (undated) DEVICE — TROCAR HERNIA BALLON STRUCTURED 10 MM

## (undated) DEVICE — GLOVE SRG BIOGEL ECLIPSE 7.5

## (undated) DEVICE — SUT VICRYL 0 UR-6 27 IN J603H

## (undated) DEVICE — ENDOPATH XCEL BLADELESS TROCARS WITH STABILITY SLEEVES: Brand: ENDOPATH XCEL

## (undated) DEVICE — COTTON TIP APPLICTOR 2 PK

## (undated) DEVICE — LIGHT HANDLE COVER SLEEVE DISP BLUE STELLAR